# Patient Record
Sex: FEMALE | Race: WHITE | NOT HISPANIC OR LATINO | Employment: OTHER | URBAN - METROPOLITAN AREA
[De-identification: names, ages, dates, MRNs, and addresses within clinical notes are randomized per-mention and may not be internally consistent; named-entity substitution may affect disease eponyms.]

---

## 2017-08-03 PROBLEM — Z85.3 HISTORY OF BREAST CANCER: Status: ACTIVE | Noted: 2017-08-03

## 2018-01-24 PROBLEM — Q25.1 COARCTATION OF THE AORTA, SIMPLEX: Status: ACTIVE | Noted: 2018-01-24

## 2018-03-13 ENCOUNTER — TELEPHONE (OUTPATIENT)
Dept: HEMATOLOGY ONCOLOGY | Facility: MEDICAL CENTER | Age: 68
End: 2018-03-13

## 2018-03-13 DIAGNOSIS — Z87.891 HISTORY OF TOBACCO USE: ICD-10-CM

## 2018-03-13 NOTE — TELEPHONE ENCOUNTER
Followed up with patient and informed her that we need to wait until after her shared decision making visit to order and schedule the low dose CT for lung cancer screening because she needs to complete the shared decision making visit before the insurance will approve the exam.  Informed her that we can schedule her for the LDCT when she is seen in our office on March 19.  Pt verbalized understanding and agreed.

## 2018-03-13 NOTE — TELEPHONE ENCOUNTER
Patient called medical oncology/hematology office very upset and frustrated that she is unable to get scheduled for her low dose CT ordered by Dr. Olivares in [place]. RN provided education regarding process for lung cancer screening program, explaining that we prescreen referrals to ensure the patient meets eligibility criteria for the program and then the patient sees our APRN for a shared decision making visit prior to LDCT being performed/approved by insurance.      Received referral to lung cancer screening program.  Chart review to assess for lung cancer screening program eligibility.   1. Age 55-77 yrs of age? Yes 67 y.o.  2. 30 pack year hx of smoking, or greater? Yes 1 [id number]= 30 pkyr hx  3. Current smoker or if quit, has pt quit within last 15 yrs?Yes    4. Any signs or symptoms of lung cancer? Pt denies cough/SOB/chest pain  5. Previous history of lung cancer? No hx of lung cancer; pt with hx of breast ca  6. Chest CT within past 12 mos.? None noted. Pt states she had a CT chest performed about a year and a half ago.  Patient does meet eligibility criteria. Patient scheduled by RN for shared decision making visit.   RN scheduled pt for SDM visit on March 19 at 2pm at [address] [facility] [address].    Pt states she wants to have her LDCT performed on March 26 in the afternoon as she has an appointment with cardiology on that day.

## 2018-03-19 ENCOUNTER — OFFICE VISIT (OUTPATIENT)
Dept: HEMATOLOGY ONCOLOGY | Facility: MEDICAL CENTER | Age: 68
End: 2018-03-19
Payer: MEDICARE

## 2018-03-19 VITALS
WEIGHT: 156.42 LBS | HEART RATE: 75 BPM | RESPIRATION RATE: 14 BRPM | OXYGEN SATURATION: 94 % | SYSTOLIC BLOOD PRESSURE: 116 MMHG | DIASTOLIC BLOOD PRESSURE: 72 MMHG | TEMPERATURE: 98.4 F | BODY MASS INDEX: 23.1 KG/M2

## 2018-03-19 DIAGNOSIS — Z87.891 PERSONAL HISTORY OF NICOTINE DEPENDENCE: ICD-10-CM

## 2018-03-19 PROCEDURE — G0296 VISIT TO DETERM LDCT ELIG: HCPCS | Performed by: NURSE PRACTITIONER

## 2018-03-19 ASSESSMENT — ENCOUNTER SYMPTOMS
HEMOPTYSIS: 0
COUGH: 0
SPUTUM PRODUCTION: 0
SHORTNESS OF BREATH: 1
WEIGHT LOSS: 0
WHEEZING: 0

## 2018-03-19 ASSESSMENT — PAIN SCALES - GENERAL: PAINLEVEL: NO PAIN

## 2018-03-19 NOTE — PROGRESS NOTES
Subjective:      Jenise Arango is a 67 y.o. female who presents for Lung Cancer Screening Program, for lung cancer screening shared decision making visit.         HPI    Patient seen today for initial lung cancer screening visit. Patient referred by Dr. Olivares, oncologist in [place].     The patient meets eligibility criteria including age, smoking history (30+ pack years), if former smoker, quit in the last 15 years, and absence of signs or symptoms of lung cancer.    - Age - 67  - Smoking history - Patient has smoked for 35 years at an average of 1 ppd = 35 pack year smoking history.  - Current smoking status - Former smoker. Quit Dec. 13, 2011, 6.5 years ago.   - No symptoms of lung cancer and no previous history of lung cancer     Allergies   Allergen Reactions   • Ceclor [Cefaclor]    • Norco [Apap-Fd&C Yellow #10 [place]-Hydrocodone] Vomiting   • Septra [Bactrim Ds]      Current Outpatient Prescriptions on File Prior to Visit   Medication Sig Dispense Refill   • aspirin EC (ECOTRIN) 81 MG Tablet Delayed Response Take 1 Tab by mouth every day. 100 Tab 3   • Cholecalciferol (VITAMIN D-3) 1000 UNITS Cap Take 1 Capsule by mouth every day. 90 Cap 3     No current facility-administered medications on file prior to visit.        Review of Systems   Constitutional: Negative for malaise/fatigue and weight loss.   Respiratory: Positive for shortness of breath (with exertion). Negative for cough, hemoptysis, sputum production and wheezing.           Objective:     /72   Pulse 75   Temp 36.9 °C (98.4 °F)   Resp 14   Wt 70.9 kg (156 lb 6.7 oz)   SpO2 94%   BMI 23.10 kg/m²      Physical Exam   Constitutional: She is oriented to person, place, and time. She appears well-developed and well-nourished. No distress.   HENT:   Head: Normocephalic and atraumatic.   Cardiovascular: Normal rate, regular rhythm and normal heart sounds.  Exam reveals no gallop and no friction rub.    No murmur heard.  Pulmonary/Chest:  Effort normal and breath sounds normal. No respiratory distress. She has no wheezes.   Musculoskeletal: Normal range of motion.   Neurological: She is alert and oriented to person, place, and time.   Skin: Skin is warm and dry. She is not diaphoretic.   Vitals reviewed.              Assessment/Plan:     1. Personal history of nicotine dependence  CT-LUNG CANCER-SCREENING         We conducted a shared decision-making process using a decision aid. We reviewed benefits and harms of screening, including false positives and potential need for additional diagnostic testing, the possibility of over diagnosis, and total radiation exposure.    We discussed the importance of adhering to annual LDCT screening. We also discussed the impact of comorbities on the patient's the ability or willingness to undergo diagnostic procedure(s) and treatment.    Counseling on the importance of maintaining cigarette smoking abstinence if former smoker; or the importance of smoking cessation if current smoker and, if appropriate, furnishing of information about tobacco cessation interventions.    Based on our discussion, we have decided to begin annual lung cancer screening starting now.

## 2018-03-29 ENCOUNTER — APPOINTMENT (OUTPATIENT)
Dept: RADIOLOGY | Facility: MEDICAL CENTER | Age: 68
End: 2018-03-29
Attending: NURSE PRACTITIONER
Payer: MEDICARE

## 2018-04-03 ENCOUNTER — HOSPITAL ENCOUNTER (OUTPATIENT)
Dept: RADIOLOGY | Facility: MEDICAL CENTER | Age: 68
End: 2018-04-03
Attending: NURSE PRACTITIONER
Payer: MEDICARE

## 2018-04-03 DIAGNOSIS — Z87.891 PERSONAL HISTORY OF NICOTINE DEPENDENCE: ICD-10-CM

## 2018-04-03 PROCEDURE — G0297 LDCT FOR LUNG CA SCREEN: HCPCS

## 2018-04-04 ENCOUNTER — TELEPHONE (OUTPATIENT)
Dept: HEMATOLOGY ONCOLOGY | Facility: MEDICAL CENTER | Age: 68
End: 2018-04-04

## 2018-04-04 NOTE — TELEPHONE ENCOUNTER
"Phoned patient with results of LDCT exam performed on 4/3/2018 .  Notified her that the results showed one very small nodule that had a benign, or non cancer, appearance. To make sure these nodule(s) are benign, and remain unchanged, we recommend a follow-up low-dose chest CT in 12 months.   Informed of incidental findings of \"tortuous aortic with prominence of the proximal descending thoracic aorta that could indicate history of coarctation.\"  Patient states she became aware of the finding in when she had her previous CT scan in March 2016.  She states she is planning to see a cardiologist in two months.  Patient agrees to all recommendations. Referring provider Dr. Casie Olivares and Dr. Gee Perez faxed results and recommendations. Health maintenance updated and patient sent lung cancer screening result letter.    "

## 2018-06-15 ENCOUNTER — OFFICE VISIT (OUTPATIENT)
Dept: CARDIOLOGY | Facility: CLINIC | Age: 68
End: 2018-06-15
Payer: MEDICARE

## 2018-06-15 VITALS
HEART RATE: 81 BPM | SYSTOLIC BLOOD PRESSURE: 102 MMHG | BODY MASS INDEX: 23.11 KG/M2 | HEIGHT: 69 IN | WEIGHT: 156 LBS | OXYGEN SATURATION: 94 % | DIASTOLIC BLOOD PRESSURE: 78 MMHG

## 2018-06-15 DIAGNOSIS — R07.9 CHEST PAIN, UNSPECIFIED TYPE: ICD-10-CM

## 2018-06-15 DIAGNOSIS — Q25.1 COARCTATION OF AORTA: ICD-10-CM

## 2018-06-15 DIAGNOSIS — R06.09 DYSPNEA ON EXERTION: ICD-10-CM

## 2018-06-15 LAB — EKG IMPRESSION: NORMAL

## 2018-06-15 PROCEDURE — 93000 ELECTROCARDIOGRAM COMPLETE: CPT | Performed by: INTERNAL MEDICINE

## 2018-06-15 PROCEDURE — 99205 OFFICE O/P NEW HI 60 MIN: CPT | Mod: 25 | Performed by: INTERNAL MEDICINE

## 2018-06-15 ASSESSMENT — ENCOUNTER SYMPTOMS
ABDOMINAL PAIN: 0
SHORTNESS OF BREATH: 1
DEPRESSION: 0
DIZZINESS: 0
ORTHOPNEA: 0
FALLS: 0
LOSS OF CONSCIOUSNESS: 0
PND: 0
PALPITATIONS: 0

## 2018-06-15 NOTE — LETTER
[facility] [address],   [address] [place] [address]  Phone: [phone]  Fax: [phone]              Jenise Arango  1950    Encounter Date: 6/15/2018    Alda Crews M.D.          PROGRESS NOTE:    Subjective:   Jenise Arango is a 67 y.o. female referred to cardiology clinic for management of a coarctation of the aorta.  Patient underwent a chest x-ray in 2016 that showed concern for possible mass.  She subsequently underwent a CT scan that showed coarctation of the aorta.    Patient feels well overall.  She exercises at the gym 3-4 times a week either on the bicycle or walking on the track.  She only gets her heart rate up to low 100s as she states that she gets short of breath should she try to exercise any more strenuous.  No pain in the legs with exercise.    She reports occasional mild aching nonradiating pain over her left breast that occurs only while she is at rest and spontaneously resolve in a few minutes.  Symptoms occur about once every 2 weeks without any associated dyspnea or dizziness.  Symptoms started about 6 months ago.  No symptoms of exercise.    She has a history of left-sided breast cancer status post lumpectomy and radiation about 3 years ago.    Referring physician: Gee Perez MD    Past Medical History:   Diagnosis Date   • Back pain    • Cancer (HCC)     LEFT BREAST CANCER   • Plantar fasciitis      Past Surgical History:   Procedure Laterality Date   • OTHER      Right Side Femur Head   • OTHER      D& C   • OTHER ORTHOPEDIC SURGERY      LEFT FEMUR     History reviewed. No pertinent family history.     Social History     Social History   • Marital status:      Spouse name: N/A   • Number of children: N/A   • Years of education: N/A     Occupational History   • Not on file.     Social History Main Topics   • Smoking status: Former Smoker     Packs/day: 1.00     Years: 35.00     Types: Cigarettes   "    Quit date: 12/13/2011   • Smokeless tobacco: Never Used   • Alcohol use 0.0 oz/week      Comment: occasionally   • Drug use: No   • Sexual activity: Yes     Partners: Male     Other Topics Concern   • Not on file     Social History Narrative   • No narrative on file     No recreational drug use.  Used to work for IT in a hospital.    Allergies   Allergen Reactions   • Ceclor [Cefaclor]    • Norco [Apap-Fd&C Yellow #10 [place]-Hydrocodone] Vomiting   • Septra [Bactrim Ds]      Outpatient Encounter Prescriptions as of 6/15/2018   Medication Sig Dispense Refill   • B Complex Vitamins (VITAMIN-B COMPLEX PO) Take  by mouth.     • Calcium Carbonate-Vit D-Min (CALCIUM 1200 PO) Take  by mouth.     • Probiotic Product (PROBIOTIC ACIDOPHILUS BEADS PO) Take  by mouth.     • Cholecalciferol (VITAMIN D-3) 1000 UNITS Cap Take 1 Capsule by mouth every day. 90 Cap 3   • aspirin EC (ECOTRIN) 81 MG Tablet Delayed Response Take 1 Tab by mouth every day. 100 Tab 3     No facility-administered encounter medications on file as of 6/15/2018.      Review of Systems   Constitutional: Negative for malaise/fatigue.   Respiratory: Positive for shortness of breath.    Cardiovascular: Positive for chest pain. Negative for palpitations, orthopnea, leg swelling and PND.   Gastrointestinal: Negative for abdominal pain.   Musculoskeletal: Negative for falls.   Neurological: Negative for dizziness and loss of consciousness.   Psychiatric/Behavioral: Negative for depression.   All other systems reviewed and are negative.       Objective:   /78   Pulse 81   Ht 1.753 m (5' 9\")   Wt 70.8 kg (156 lb)   SpO2 94%   BMI 23.04 kg/m²      Physical Exam   Constitutional: She is oriented to person, place, and time. She appears well-developed and well-nourished. No distress.   HENT:   Head: Normocephalic and atraumatic.   Eyes: Conjunctivae are normal.   Neck: Normal range of motion. Neck supple.   Cardiovascular: Normal rate, regular rhythm and " normal heart sounds.  Exam reveals no gallop and no friction rub.    No murmur heard.  Normal pulses in the upper extremities.  Slightly diminished in bilateral lower extremities.   Pulmonary/Chest: Effort normal and breath sounds normal. No respiratory distress. She has no wheezes. She has no rales.   Abdominal: Soft. She exhibits no distension. There is no tenderness.   Musculoskeletal: She exhibits no edema.   Neurological: She is alert and oriented to person, place, and time.   Skin: Skin is warm and dry. She is not diaphoretic.   Psychiatric: She has a normal mood and affect. Her behavior is normal.   Nursing note and vitals reviewed.    EKG performed today was personally reviewed and showed normal sinus rhythm at 65 bpm.  Normal EKG.    CT chest performed in 2016.  Report was reviewed.  Patient had a coarctation of the aorta a few centimeters distal to the takeoff of the left subclavian artery.  The narrowing of the core measures 9-10 mm.  Post core dilation up to 3.3 cm involving the distal thoracic aorta.  No hypertrophy or lateralization through the intercostal arteries.    Assessment:     1. Coarctation of aorta  EKG    ECHOCARDIOGRAM COMP W/O CONT   2. Chest pain, unspecified type  TREADMILL STRESS   3. Dyspnea on exertion  ECHOCARDIOGRAM COMP W/O CONT       Medical Decision Making:  Today's Assessment / Status / Plan:     Chest discomfort:   Dyspnea:   Coarctation of the aorta:    Patient does not have hypertension.  No claudication symptoms.  No heart failure symptoms.  No other cardiac history.  Patient has mild dyspnea with extreme exertion and chest discomfort without any clear triggers.  I will refer her for an exercise treadmill test for ischemic evaluation.  She will also be referred for an echocardiogram to evaluate her coarctation and for the above-mentioned symptoms.  In terms of prognosis, given her advanced age at the time of diagnosis and the absence of hypertension and claudication  symptoms, the chance of her needing surgery for this is extremely low.  However will follow-up the above-mentioned tests and reevaluate.  Should the patient start having worsening symptoms she will let us know.  In the meantime she was advised to exercise more rigorously as tolerated.  All the above information was explained to the patient in detail.    Return to clinic in 2 months or earlier if needed.    Thank you for allowing me to participate in the care of this patient. Please do not hesitate to contact me with any questions.    Alda Crews MD  Cardiologist  [facility]      PLEASE NOTE: This dictation was created using voice recognition software.               Gee Perez M.D.  [address] [facility] [address]  VIA In Basket

## 2018-06-15 NOTE — PROGRESS NOTES
Subjective:   Jenise Arango is a 67 y.o. female referred to cardiology clinic for management of a coarctation of the aorta.  Patient underwent a chest x-ray in 2016 that showed concern for possible mass.  She subsequently underwent a CT scan that showed coarctation of the aorta.    Patient feels well overall.  She exercises at the gym 3-4 times a week either on the bicycle or walking on the track.  She only gets her heart rate up to low 100s as she states that she gets short of breath should she try to exercise any more strenuous.  No pain in the legs with exercise.    She reports occasional mild aching nonradiating pain over her left breast that occurs only while she is at rest and spontaneously resolve in a few minutes.  Symptoms occur about once every 2 weeks without any associated dyspnea or dizziness.  Symptoms started about 6 months ago.  No symptoms of exercise.    She has a history of left-sided breast cancer status post lumpectomy and radiation about 3 years ago.    Referring physician: Gee Perez MD    Past Medical History:   Diagnosis Date   • Back pain    • Cancer (HCC)     LEFT BREAST CANCER   • Plantar fasciitis      Past Surgical History:   Procedure Laterality Date   • OTHER      Right Side Femur Head   • OTHER      D& C   • OTHER ORTHOPEDIC SURGERY      LEFT FEMUR     History reviewed. No pertinent family history.     Social History     Social History   • Marital status:      Spouse name: N/A   • Number of children: N/A   • Years of education: N/A     Occupational History   • Not on file.     Social History Main Topics   • Smoking status: Former Smoker     Packs/day: 1.00     Years: 35.00     Types: Cigarettes     Quit date: 12/13/2011   • Smokeless tobacco: Never Used   • Alcohol use 0.0 oz/week      Comment: occasionally   • Drug use: No   • Sexual activity: Yes     Partners: Male     Other Topics Concern   • Not on file     Social History Narrative   • No narrative on file     No  "recreational drug use.  Used to work for IT in a hospital.    Allergies   Allergen Reactions   • Ceclor [Cefaclor]    • Norco [Apap-Fd&C Yellow #10 [place]-Hydrocodone] Vomiting   • Septra [Bactrim Ds]      Outpatient Encounter Prescriptions as of 6/15/2018   Medication Sig Dispense Refill   • B Complex Vitamins (VITAMIN-B COMPLEX PO) Take  by mouth.     • Calcium Carbonate-Vit D-Min (CALCIUM 1200 PO) Take  by mouth.     • Probiotic Product (PROBIOTIC ACIDOPHILUS BEADS PO) Take  by mouth.     • Cholecalciferol (VITAMIN D-3) 1000 UNITS Cap Take 1 Capsule by mouth every day. 90 Cap 3   • aspirin EC (ECOTRIN) 81 MG Tablet Delayed Response Take 1 Tab by mouth every day. 100 Tab 3     No facility-administered encounter medications on file as of 6/15/2018.      Review of Systems   Constitutional: Negative for malaise/fatigue.   Respiratory: Positive for shortness of breath.    Cardiovascular: Positive for chest pain. Negative for palpitations, orthopnea, leg swelling and PND.   Gastrointestinal: Negative for abdominal pain.   Musculoskeletal: Negative for falls.   Neurological: Negative for dizziness and loss of consciousness.   Psychiatric/Behavioral: Negative for depression.   All other systems reviewed and are negative.       Objective:   /78   Pulse 81   Ht 1.753 m (5' 9\")   Wt 70.8 kg (156 lb)   SpO2 94%   BMI 23.04 kg/m²     Physical Exam   Constitutional: She is oriented to person, place, and time. She appears well-developed and well-nourished. No distress.   HENT:   Head: Normocephalic and atraumatic.   Eyes: Conjunctivae are normal.   Neck: Normal range of motion. Neck supple.   Cardiovascular: Normal rate, regular rhythm and normal heart sounds.  Exam reveals no gallop and no friction rub.    No murmur heard.  Normal pulses in the upper extremities.  Slightly diminished in bilateral lower extremities.   Pulmonary/Chest: Effort normal and breath sounds normal. No respiratory distress. She has no " wheezes. She has no rales.   Abdominal: Soft. She exhibits no distension. There is no tenderness.   Musculoskeletal: She exhibits no edema.   Neurological: She is alert and oriented to person, place, and time.   Skin: Skin is warm and dry. She is not diaphoretic.   Psychiatric: She has a normal mood and affect. Her behavior is normal.   Nursing note and vitals reviewed.    EKG performed today was personally reviewed and showed normal sinus rhythm at 65 bpm.  Normal EKG.    CT chest performed in 2016.  Report was reviewed.  Patient had a coarctation of the aorta a few centimeters distal to the takeoff of the left subclavian artery.  The narrowing of the core measures 9-10 mm.  Post core dilation up to 3.3 cm involving the distal thoracic aorta.  No hypertrophy or lateralization through the intercostal arteries.    Assessment:     1. Coarctation of aorta  EKG    ECHOCARDIOGRAM COMP W/O CONT   2. Chest pain, unspecified type  TREADMILL STRESS   3. Dyspnea on exertion  ECHOCARDIOGRAM COMP W/O CONT       Medical Decision Making:  Today's Assessment / Status / Plan:     Chest discomfort:   Dyspnea:   Coarctation of the aorta:    Patient does not have hypertension.  No claudication symptoms.  No heart failure symptoms.  No other cardiac history.  Patient has mild dyspnea with extreme exertion and chest discomfort without any clear triggers.  I will refer her for an exercise treadmill test for ischemic evaluation.  She will also be referred for an echocardiogram to evaluate her coarctation and for the above-mentioned symptoms.  In terms of prognosis, given her advanced age at the time of diagnosis and the absence of hypertension and claudication symptoms, the chance of her needing surgery for this is extremely low.  However will follow-up the above-mentioned tests and reevaluate.  Should the patient start having worsening symptoms she will let us know.  In the meantime she was advised to exercise more rigorously as  tolerated.  All the above information was explained to the patient in detail.    Return to clinic in 2 months or earlier if needed.    Thank you for allowing me to participate in the care of this patient. Please do not hesitate to contact me with any questions.    Alda Crews MD  Cardiologist  [facility]      PLEASE NOTE: This dictation was created using voice recognition software.

## 2018-08-21 ENCOUNTER — OFFICE VISIT (OUTPATIENT)
Dept: CARDIOLOGY | Facility: CLINIC | Age: 68
End: 2018-08-21
Payer: MEDICARE

## 2018-08-21 VITALS
HEIGHT: 69 IN | DIASTOLIC BLOOD PRESSURE: 70 MMHG | WEIGHT: 153 LBS | OXYGEN SATURATION: 96 % | HEART RATE: 66 BPM | BODY MASS INDEX: 22.66 KG/M2 | SYSTOLIC BLOOD PRESSURE: 100 MMHG

## 2018-08-21 DIAGNOSIS — Q25.1 COARCTATION OF THE AORTA, SIMPLEX: ICD-10-CM

## 2018-08-21 DIAGNOSIS — R07.9 CHEST PAIN, UNSPECIFIED TYPE: ICD-10-CM

## 2018-08-21 DIAGNOSIS — R06.09 DYSPNEA ON EXERTION: ICD-10-CM

## 2018-08-21 PROCEDURE — 99215 OFFICE O/P EST HI 40 MIN: CPT | Performed by: INTERNAL MEDICINE

## 2018-08-21 RX ORDER — MAGNESIUM OXIDE 400 MG/1
400 TABLET ORAL DAILY
COMMUNITY

## 2018-08-21 ASSESSMENT — ENCOUNTER SYMPTOMS
DEPRESSION: 0
PALPITATIONS: 0
SHORTNESS OF BREATH: 1
ABDOMINAL PAIN: 0
LOSS OF CONSCIOUSNESS: 0
DIZZINESS: 0
FALLS: 0
ORTHOPNEA: 0
PND: 0

## 2018-08-21 NOTE — LETTER
[facility] [address],   [address]nd [place] [address]  Phone: [phone]  Fax: [phone]              Jenise Arango  1950    Encounter Date: 8/21/2018    Alda Crews M.D.          PROGRESS NOTE:    Subjective:   Jenise Arango is a 67 y.o. female presenting to clinic for follow-up on her coarctation of the aorta.      Pertinent history:  Coarctation of the aorta  History of left-sided breast cancer status post lumpectomy and radiation around 2015.    Since her last appointment, patient has been feeling really well.  She has not started exercising a lot more frequently.  She has been swimming regularly.  Does report dyspnea on exertion with swimming.  Overall however her symptoms have been improving.  No chest discomfort with exertion.    She describes a history of left-sided pain underneath her left breast that has been ongoing for the past 20 years.  Patient has previously been told she has costochondritis and her symptoms are unchanged in severity or frequency.     Past Medical History:   Diagnosis Date   • Back pain    • Cancer (HCC)     LEFT BREAST CANCER   • Plantar fasciitis      Past Surgical History:   Procedure Laterality Date   • OTHER      Right Side Femur Head   • OTHER      D& C   • OTHER ORTHOPEDIC SURGERY      LEFT FEMUR     History reviewed. No pertinent family history.     Social History     Social History   • Marital status:      Spouse name: N/A   • Number of children: N/A   • Years of education: N/A     Occupational History   • Not on file.     Social History Main Topics   • Smoking status: Former Smoker     Packs/day: 1.00     Years: 35.00     Types: Cigarettes     Quit date: 12/13/2011   • Smokeless tobacco: Never Used   • Alcohol use 0.0 oz/week      Comment: occasionally   • Drug use: No   • Sexual activity: Yes     Partners: Male     Other Topics Concern   • Not on file     Social History Narrative    "  • No narrative on file     No recreational drug use.  Used to work for IT in a hospital.    Allergies   Allergen Reactions   • Ceclor [Cefaclor]    • Norco [Apap-Fd&C Yellow #10 [place]-Hydrocodone] Vomiting   • Septra [Bactrim Ds]      Outpatient Encounter Prescriptions as of 8/21/2018   Medication Sig Dispense Refill   • magnesium oxide (MAG-OX) 400 MG Tab Take 400 mg by mouth every day.     • B Complex Vitamins (VITAMIN-B COMPLEX PO) Take  by mouth.     • Calcium Carbonate-Vit D-Min (CALCIUM 1200 PO) Take  by mouth.     • Probiotic Product (PROBIOTIC ACIDOPHILUS BEADS PO) Take  by mouth.     • Cholecalciferol (VITAMIN D-3) 1000 UNITS Cap Take 1 Capsule by mouth every day. 90 Cap 3   • [DISCONTINUED] aspirin EC (ECOTRIN) 81 MG Tablet Delayed Response Take 1 Tab by mouth every day. 100 Tab 3     No facility-administered encounter medications on file as of 8/21/2018.      Review of Systems   Constitutional: Negative for malaise/fatigue.   Respiratory: Positive for shortness of breath.    Cardiovascular: Positive for chest pain. Negative for palpitations, orthopnea, leg swelling and PND.   Gastrointestinal: Negative for abdominal pain.   Musculoskeletal: Negative for falls.   Neurological: Negative for dizziness and loss of consciousness.   Psychiatric/Behavioral: Negative for depression.   All other systems reviewed and are negative.       Objective:   /70   Pulse 66   Ht 1.753 m (5' 9\")   Wt 69.4 kg (153 lb)   SpO2 96%   BMI 22.59 kg/m²      Physical Exam   Constitutional: She is oriented to person, place, and time. She appears well-developed and well-nourished. No distress.   HENT:   Head: Normocephalic and atraumatic.   Eyes: Conjunctivae are normal.   Neck: Normal range of motion. Neck supple.   Cardiovascular: Normal rate, regular rhythm and normal heart sounds.  Exam reveals no gallop and no friction rub.    No murmur heard.  Pulmonary/Chest: Effort normal and breath sounds normal. No respiratory " distress. She has no wheezes. She has no rales.   Abdominal: Soft. She exhibits no distension. There is no tenderness.   Musculoskeletal: She exhibits no edema.   Neurological: She is alert and oriented to person, place, and time.   Skin: Skin is warm and dry. She is not diaphoretic.   Psychiatric: She has a normal mood and affect. Her behavior is normal.   Nursing note and vitals reviewed.    CT chest performed in 2016 showed a coarctation of the aorta a few centimeters distal to the takeoff of the left subclavian artery.  The narrowing of the core measures 9-10 mm.  Post core dilation up to 3.3 cm involving the distal thoracic aorta.  No hypertrophy or lateralization through the intercostal arteries.    Echocardiogram performed in June 2018 was reviewed and showed normal LV systolic function.  EF 65%.  Coarctation of the aorta with a peak gradient of 13 mmHg.  No major valvular pathology noted.    Exercise treadmill test performed in June 2018.  Tracings were personally reviewed.  Patient exercised for 8 minutes and 18 seconds on the Vladimir protocol.  Achieved 10.1 METs.  No arrhythmias noted.  Isolated PVCs noted.  No ST-T wave changes noted.    Assessment:     1. Coarctation of the aorta, simplex     2. Chest pain, unspecified type     3. Dyspnea on exertion         Medical Decision Making:  Today's Assessment / Status / Plan:     Coarctation of the aorta: Echocardiogram reviewed as above.  Peak gradient across the aortic coarctation is 13 mmHg, mild coarctation.  Patient will need echocardiograms every 2 or 3 years for reevaluation unless symptoms change.    Dyspnea on exertion: Possibly related to her significant smoking history.  She does not have an official COPD diagnosis.  Unlikely to be her anginal equivalent as her treadmill stress test was unremarkable as noted above.  Continue exercising as tolerated.  She may need to follow-up with her primary care physician to discuss inhalers if needed or pulmonary  function testing as deemed necessary by her PCP.    Chest discomfort: Still has ongoing symptoms.  Atypical in nature.  Likely musculoskeletal as discussed above in the HPI.  Low risk treadmill stress test.  Patient can try warm/cold compresses as needed.  Try stretching exercises as well.  NSAIDs as needed.  No further cardiac workup at this time.      Return to clinic in 1 year or earlier if needed.    Thank you for allowing me to participate in the care of this patient. Please do not hesitate to contact me with any questions.    Alda Crews MD  Cardiologist  [facility]      PLEASE NOTE: This dictation was created using voice recognition software.               Gee Perez M.D.  [address] [facility] [address]  VIA In Basket

## 2018-08-29 PROBLEM — E55.9 VITAMIN D DEFICIENCY: Status: ACTIVE | Noted: 2018-08-29

## 2018-08-29 PROBLEM — M81.8 AGE-RELATED OSTEOPOROSIS WITHOUT FRACTURE: Status: ACTIVE | Noted: 2018-08-29

## 2018-12-18 ENCOUNTER — TELEPHONE (OUTPATIENT)
Dept: HEMATOLOGY ONCOLOGY | Facility: MEDICAL CENTER | Age: 68
End: 2018-12-18

## 2018-12-18 NOTE — TELEPHONE ENCOUNTER
"Patient is requesting a call back regarding an office visit on 03/19/2018 with LIZBETH Harrison. Patient stated \"I'm not quite sure but I thought Sharmaine had mentioned she was going to continue to order the CT Lung scan. Can I please get a phone call as soon as possible.\" I let patient know I will relay this message to LIZBETH Hussein due to Sharmaine being out of office today and tomorrow.  "

## 2018-12-20 NOTE — TELEPHONE ENCOUNTER
"Called and spoke with patient regarding Lung Ct Scan, I let patient know per Wendy Swan, LIZBETH:    \"Joyce put in a message 4/4/18 - annual CT will be ordered by PCP from now on.\"    Patient was grateful and will call our office if there are any further questions or concerns.   "

## 2019-04-04 PROBLEM — J45.20 INTERMITTENT ASTHMA WITHOUT COMPLICATION: Status: ACTIVE | Noted: 2019-04-04

## 2019-05-22 ENCOUNTER — HOSPITAL ENCOUNTER (OUTPATIENT)
Dept: RADIOLOGY | Facility: MEDICAL CENTER | Age: 69
End: 2019-05-22
Attending: INTERNAL MEDICINE
Payer: MEDICARE

## 2019-05-22 DIAGNOSIS — Z87.891 PERSONAL HISTORY OF TOBACCO USE, PRESENTING HAZARDS TO HEALTH: ICD-10-CM

## 2019-05-22 PROCEDURE — G0297 LDCT FOR LUNG CA SCREEN: HCPCS

## 2019-05-29 ENCOUNTER — OFFICE VISIT (OUTPATIENT)
Dept: CARDIOLOGY | Facility: MEDICAL CENTER | Age: 69
End: 2019-05-29
Payer: MEDICARE

## 2019-05-29 VITALS
BODY MASS INDEX: 23.25 KG/M2 | WEIGHT: 157 LBS | HEART RATE: 100 BPM | OXYGEN SATURATION: 95 % | DIASTOLIC BLOOD PRESSURE: 60 MMHG | SYSTOLIC BLOOD PRESSURE: 90 MMHG | HEIGHT: 69 IN

## 2019-05-29 DIAGNOSIS — Q25.1 COARCTATION OF THE AORTA, SIMPLEX: ICD-10-CM

## 2019-05-29 PROCEDURE — 99214 OFFICE O/P EST MOD 30 MIN: CPT | Performed by: INTERNAL MEDICINE

## 2019-05-29 RX ORDER — ALBUTEROL SULFATE 90 UG/1
AEROSOL, METERED RESPIRATORY (INHALATION)
Refills: 0 | COMMUNITY
Start: 2019-04-04 | End: 2019-05-29

## 2019-05-29 RX ORDER — INHALER, ASSIST DEVICES
SPACER (EA) MISCELLANEOUS
Refills: 0 | COMMUNITY
Start: 2019-04-04 | End: 2019-05-29

## 2019-05-29 RX ORDER — ALBUTEROL SULFATE 90 UG/1
AEROSOL, METERED RESPIRATORY (INHALATION)
COMMUNITY
Start: 2019-04-04 | End: 2019-05-29

## 2019-05-29 ASSESSMENT — ENCOUNTER SYMPTOMS
DEPRESSION: 0
LOSS OF CONSCIOUSNESS: 0
FALLS: 0
DIZZINESS: 0
PND: 0
PALPITATIONS: 0
ABDOMINAL PAIN: 0
ORTHOPNEA: 0
SHORTNESS OF BREATH: 0

## 2019-05-29 NOTE — PROGRESS NOTES
Subjective:   Jenise Arango is a 62 female presented clinic for follow-up on coarctation of the aorta.    Pertinent history:  Coarctation of the aorta  History of left-sided breast cancer status post lumpectomy and radiation around 2015.    Patient is planning to clinic today to discuss her CT chest results.  She was undergoing a CT for screening purposes given her smoking history and is worried about the atherosclerosis, calcification and dilatation noted in her aorta.    She has otherwise been feeling well overall.  She has been exercising regularly and denies any anginal symptoms.      Past Medical History:   Diagnosis Date   • Back pain    • Cancer (HCC)     LEFT BREAST CANCER   • Plantar fasciitis      Past Surgical History:   Procedure Laterality Date   • OTHER      Right Side Femur Head   • OTHER      D& C   • OTHER ORTHOPEDIC SURGERY      LEFT FEMUR     History reviewed. No pertinent family history.     Social History     Social History   • Marital status:      Spouse name: N/A   • Number of children: N/A   • Years of education: N/A     Occupational History   • Not on file.     Social History Main Topics   • Smoking status: Former Smoker     Packs/day: 1.00     Years: 35.00     Types: Cigarettes     Quit date: 12/13/2011   • Smokeless tobacco: Never Used   • Alcohol use 0.0 oz/week      Comment: occasionally   • Drug use: No   • Sexual activity: Yes     Partners: Male     Other Topics Concern   • Not on file     Social History Narrative   • No narrative on file     Allergies   Allergen Reactions   • Ceclor [Cefaclor]    • Norco [Apap-Fd&C Yellow #10 [place]-Hydrocodone] Vomiting   • Septra [Bactrim Ds]      Outpatient Encounter Prescriptions as of 5/29/2019   Medication Sig Dispense Refill   • magnesium oxide (MAG-OX) 400 MG Tab Take 400 mg by mouth every day.     • B Complex Vitamins (VITAMIN-B COMPLEX PO) Take  by mouth.     • Calcium Carbonate-Vit D-Min (CALCIUM 1200 PO) Take  by mouth.     •  "Probiotic Product (PROBIOTIC ACIDOPHILUS BEADS PO) Take  by mouth.     • Cholecalciferol (VITAMIN D-3) 1000 UNITS Cap Take 1 Capsule by mouth every day. 90 Cap 3   • [DISCONTINUED] Spacer/Aero-Holding Chambers (OPTICHAMBER GILMA) Misc USE AS DIRECTED  0   • [DISCONTINUED] albuterol 108 (90 Base) MCG/ACT Aero Soln inhalation aerosol inhale 2 puffs by mouth every 6 hours if needed for shortness of breath  0   • [DISCONTINUED] albuterol 108 (90 Base) MCG/ACT Aero Soln inhalation aerosol      • [DISCONTINUED] albuterol 108 (90 Base) MCG/ACT Aero Soln inhalation aerosol      • [DISCONTINUED] doxycycline (VIBRAMYCIN) 100 MG Tab Take 1 Tab by mouth 2 times a day. (Patient not taking: Reported on 5/29/2019) 20 Tab 0   • [DISCONTINUED] fluticasone (FLOVENT HFA) 220 MCG/ACT Aerosol Inhale 1 Puff by mouth 2 times a day. (Patient not taking: Reported on 5/29/2019) 1 Inhaler 3   • [DISCONTINUED] albuterol 108 (90 Base) MCG/ACT Aero Soln inhalation aerosol Inhale 2 Puffs by mouth every 6 hours as needed for Shortness of Breath. (Patient not taking: Reported on 5/29/2019) 8.5 g 3     No facility-administered encounter medications on file as of 5/29/2019.      Review of Systems   Constitutional: Negative for malaise/fatigue.   Respiratory: Negative for shortness of breath.    Cardiovascular: Negative for chest pain, palpitations, orthopnea, leg swelling and PND.   Gastrointestinal: Negative for abdominal pain.   Musculoskeletal: Negative for falls.   Neurological: Negative for dizziness and loss of consciousness.   Psychiatric/Behavioral: Negative for depression.   All other systems reviewed and are negative.       Objective:   BP (!) 90/60 (BP Location: Left arm, Patient Position: Sitting, BP Cuff Size: Adult)   Pulse 100   Ht 1.753 m (5' 9\")   Wt 71.2 kg (157 lb)   SpO2 95%   BMI 23.18 kg/m²     Physical Exam   Constitutional: She is oriented to person, place, and time. She appears well-developed and well-nourished. No " distress.   HENT:   Head: Normocephalic and atraumatic.   Eyes: Conjunctivae are normal. No scleral icterus.   Neck: Normal range of motion. Neck supple.   Cardiovascular: Normal rate, regular rhythm and normal heart sounds.  Exam reveals no gallop and no friction rub.    No murmur heard.  Pulmonary/Chest: Effort normal and breath sounds normal. No respiratory distress. She has no wheezes. She has no rales.   Abdominal: Soft. She exhibits no distension. There is no tenderness.   Musculoskeletal: She exhibits no edema.   Neurological: She is alert and oriented to person, place, and time.   Skin: Skin is warm and dry. She is not diaphoretic.   Psychiatric: She has a normal mood and affect. Her behavior is normal.   Nursing note and vitals reviewed.    CT chest performed in 2016 showed a coarctation of the aorta a few centimeters distal to the takeoff of the left subclavian artery.  The narrowing of the core measures 9-10 mm.  Post core dilation up to 3.3 cm involving the distal thoracic aorta.  No hypertrophy or lateralization through the intercostal arteries.    Echocardiogram performed in June 2018 showed normal LV systolic function.  EF 65%.  Coarctation of the aorta with a peak gradient of 13 mmHg.  No major valvular pathology noted.    Exercise treadmill test performed in June 2018. Patient exercised for 8 minutes and 18 seconds on the Vladimir protocol.  Achieved 10.1 METs.  No arrhythmias noted.  Isolated PVCs noted.  No ST-T wave changes noted.    Labs performed in April 2019 were reviewed and showed normal creatinine.    CT scan performed in May 2019, results were reviewed personally and showed atherosclerosis of the thoracic aorta with coronary artery calcifications.  Mild dilatation of the descending thoracic aorta measuring 3.4 cm, unchanged from prior study.    Assessment:     1. Coarctation of the aorta, simplex         Medical Decision Making:  Today's Assessment / Status / Plan:     Coaptation of the  aorta:  Abnormal CT results:  Dilatation of the descending thoracic aorta:  Coronary artery calcification:  Atherosclerosis of the thoracic aorta:    Patient has been reassured.  The dilatation of her descending aorta is unchanged from before and is not unexpected in the setting of her known coarctation..  In terms of her coarctation, she needs repeat echocardiograms every 2 to 3 years.  She has a significant and coronary artery calcification and atherosclerosis is not unexpected.  Patient exercises regularly and denies any anginal symptoms should she start having any anginal symptoms, she should let us know.    Total 25 minutes face-to-face time spent with patient, with greater than 50% of the total time discussing patient's issues and symptoms as listed above in assessment and plan, as well as managing coordination of care for future evaluation and treatment. Most of the time was spent discussing CT scan results, calcification, dilatation as noted above.     Return to clinic in 1 year or earlier if needed.    Thank you for allowing me to participate in the care of this patient. Please do not hesitate to contact me with any questions.    Alda Crews MD  Cardiologist  [facility]      PLEASE NOTE: This dictation was created using voice recognition software.

## 2019-05-29 NOTE — LETTER
[facility]   [address], [facility] [address]  [place] [address]  Phone: [phone]  Fax: [phone]              Jenise Arango  1950    Encounter Date: 5/29/2019    Alda Crews M.D.          PROGRESS NOTE:    Subjective:   Jenise Arango is a 62 female presented clinic for follow-up on coarctation of the aorta.    Pertinent history:  Coarctation of the aorta  History of left-sided breast cancer status post lumpectomy and radiation around 2015.    Patient is planning to clinic today to discuss her CT chest results.  She was undergoing a CT for screening purposes given her smoking history and is worried about the atherosclerosis, calcification and dilatation noted in her aorta.    She has otherwise been feeling well overall.  She has been exercising regularly and denies any anginal symptoms.      Past Medical History:   Diagnosis Date   • Back pain    • Cancer (HCC)     LEFT BREAST CANCER   • Plantar fasciitis      Past Surgical History:   Procedure Laterality Date   • OTHER      Right Side Femur Head   • OTHER      D& C   • OTHER ORTHOPEDIC SURGERY      LEFT FEMUR     History reviewed. No pertinent family history.     Social History     Social History   • Marital status:      Spouse name: N/A   • Number of children: N/A   • Years of education: N/A     Occupational History   • Not on file.     Social History Main Topics   • Smoking status: Former Smoker     Packs/day: 1.00     Years: 35.00     Types: Cigarettes     Quit date: 12/13/2011   • Smokeless tobacco: Never Used   • Alcohol use 0.0 oz/week      Comment: occasionally   • Drug use: No   • Sexual activity: Yes     Partners: Male     Other Topics Concern   • Not on file     Social History Narrative   • No narrative on file     Allergies   Allergen Reactions   • Ceclor [Cefaclor]    • Norco [Apap-Fd&C Yellow #10 [place]-Hydrocodone] Vomiting   • Septra [Bactrim Ds]      Outpatient Encounter Prescriptions as  of 5/29/2019   Medication Sig Dispense Refill   • magnesium oxide (MAG-OX) 400 MG Tab Take 400 mg by mouth every day.     • B Complex Vitamins (VITAMIN-B COMPLEX PO) Take  by mouth.     • Calcium Carbonate-Vit D-Min (CALCIUM 1200 PO) Take  by mouth.     • Probiotic Product (PROBIOTIC ACIDOPHILUS BEADS PO) Take  by mouth.     • Cholecalciferol (VITAMIN D-3) 1000 UNITS Cap Take 1 Capsule by mouth every day. 90 Cap 3   • [DISCONTINUED] Spacer/Aero-Holding Chambers (OPTICHAMBER GILMA) Misc USE AS DIRECTED  0   • [DISCONTINUED] albuterol 108 (90 Base) MCG/ACT Aero Soln inhalation aerosol inhale 2 puffs by mouth every 6 hours if needed for shortness of breath  0   • [DISCONTINUED] albuterol 108 (90 Base) MCG/ACT Aero Soln inhalation aerosol      • [DISCONTINUED] albuterol 108 (90 Base) MCG/ACT Aero Soln inhalation aerosol      • [DISCONTINUED] doxycycline (VIBRAMYCIN) 100 MG Tab Take 1 Tab by mouth 2 times a day. (Patient not taking: Reported on 5/29/2019) 20 Tab 0   • [DISCONTINUED] fluticasone (FLOVENT HFA) 220 MCG/ACT Aerosol Inhale 1 Puff by mouth 2 times a day. (Patient not taking: Reported on 5/29/2019) 1 Inhaler 3   • [DISCONTINUED] albuterol 108 (90 Base) MCG/ACT Aero Soln inhalation aerosol Inhale 2 Puffs by mouth every 6 hours as needed for Shortness of Breath. (Patient not taking: Reported on 5/29/2019) 8.5 g 3     No facility-administered encounter medications on file as of 5/29/2019.      Review of Systems   Constitutional: Negative for malaise/fatigue.   Respiratory: Negative for shortness of breath.    Cardiovascular: Negative for chest pain, palpitations, orthopnea, leg swelling and PND.   Gastrointestinal: Negative for abdominal pain.   Musculoskeletal: Negative for falls.   Neurological: Negative for dizziness and loss of consciousness.   Psychiatric/Behavioral: Negative for depression.   All other systems reviewed and are negative.       Objective:   BP (!) 90/60 (BP Location: Left arm, Patient  "Position: Sitting, BP Cuff Size: Adult)   Pulse 100   Ht 1.753 m (5' 9\")   Wt 71.2 kg (157 lb)   SpO2 95%   BMI 23.18 kg/m²      Physical Exam   Constitutional: She is oriented to person, place, and time. She appears well-developed and well-nourished. No distress.   HENT:   Head: Normocephalic and atraumatic.   Eyes: Conjunctivae are normal. No scleral icterus.   Neck: Normal range of motion. Neck supple.   Cardiovascular: Normal rate, regular rhythm and normal heart sounds.  Exam reveals no gallop and no friction rub.    No murmur heard.  Pulmonary/Chest: Effort normal and breath sounds normal. No respiratory distress. She has no wheezes. She has no rales.   Abdominal: Soft. She exhibits no distension. There is no tenderness.   Musculoskeletal: She exhibits no edema.   Neurological: She is alert and oriented to person, place, and time.   Skin: Skin is warm and dry. She is not diaphoretic.   Psychiatric: She has a normal mood and affect. Her behavior is normal.   Nursing note and vitals reviewed.    CT chest performed in 2016 showed a coarctation of the aorta a few centimeters distal to the takeoff of the left subclavian artery.  The narrowing of the core measures 9-10 mm.  Post core dilation up to 3.3 cm involving the distal thoracic aorta.  No hypertrophy or lateralization through the intercostal arteries.    Echocardiogram performed in June 2018 showed normal LV systolic function.  EF 65%.  Coarctation of the aorta with a peak gradient of 13 mmHg.  No major valvular pathology noted.    Exercise treadmill test performed in June 2018. Patient exercised for 8 minutes and 18 seconds on the Vladimir protocol.  Achieved 10.1 METs.  No arrhythmias noted.  Isolated PVCs noted.  No ST-T wave changes noted.    Labs performed in April 2019 were reviewed and showed normal creatinine.    CT scan performed in May 2019, results were reviewed personally and showed atherosclerosis of the thoracic aorta with coronary artery " calcifications.  Mild dilatation of the descending thoracic aorta measuring 3.4 cm, unchanged from prior study.    Assessment:     1. Coarctation of the aorta, simplex         Medical Decision Making:  Today's Assessment / Status / Plan:     Coaptation of the aorta:  Abnormal CT results:  Dilatation of the descending thoracic aorta:  Coronary artery calcification:  Atherosclerosis of the thoracic aorta:    Patient has been reassured.  The dilatation of her descending aorta is unchanged from before and is not unexpected in the setting of her known coarctation..  In terms of her coarctation, she needs repeat echocardiograms every 2 to 3 years.  She has a significant and coronary artery calcification and atherosclerosis is not unexpected.  Patient exercises regularly and denies any anginal symptoms should she start having any anginal symptoms, she should let us know.    Total 25 minutes face-to-face time spent with patient, with greater than 50% of the total time discussing patient's issues and symptoms as listed above in assessment and plan, as well as managing coordination of care for future evaluation and treatment. Most of the time was spent discussing CT scan results, calcification, dilatation as noted above.     Return to clinic in 1 year or earlier if needed.    Thank you for allowing me to participate in the care of this patient. Please do not hesitate to contact me with any questions.    Alda Crews MD  Cardiologist  [facility]      PLEASE NOTE: This dictation was created using voice recognition software.               Gee Perez M.D.  [address] [facility] [address]  VIA In Basket

## 2019-08-08 PROBLEM — J43.9 PULMONARY EMPHYSEMA (HCC): Status: ACTIVE | Noted: 2019-08-08

## 2019-08-08 PROBLEM — I71.20 THORACIC AORTIC ANEURYSM, WITHOUT RUPTURE: Status: ACTIVE | Noted: 2019-08-08

## 2019-11-09 PROBLEM — E78.5 HYPERLIPIDEMIA WITH TARGET LDL LESS THAN 100: Status: ACTIVE | Noted: 2019-11-09

## 2020-05-27 ENCOUNTER — HOSPITAL ENCOUNTER (OUTPATIENT)
Dept: RADIOLOGY | Facility: MEDICAL CENTER | Age: 70
End: 2020-05-27
Attending: INTERNAL MEDICINE
Payer: MEDICARE

## 2020-05-27 DIAGNOSIS — R91.1 SOLITARY LUNG NODULE: ICD-10-CM

## 2020-05-27 PROCEDURE — 71250 CT THORAX DX C-: CPT

## 2020-08-20 ENCOUNTER — APPOINTMENT (OUTPATIENT)
Dept: PULMONOLOGY | Facility: MEDICAL CENTER | Age: 70
End: 2020-08-20
Payer: MEDICARE

## 2020-09-17 ENCOUNTER — OFFICE VISIT (OUTPATIENT)
Dept: CARDIOLOGY | Facility: CLINIC | Age: 70
End: 2020-09-17
Payer: MEDICARE

## 2020-09-17 VITALS
OXYGEN SATURATION: 93 % | BODY MASS INDEX: 22.66 KG/M2 | DIASTOLIC BLOOD PRESSURE: 70 MMHG | WEIGHT: 153 LBS | SYSTOLIC BLOOD PRESSURE: 110 MMHG | HEART RATE: 66 BPM | HEIGHT: 69 IN

## 2020-09-17 DIAGNOSIS — R06.02 SHORTNESS OF BREATH: Primary | ICD-10-CM

## 2020-09-17 DIAGNOSIS — I25.10 CORONARY ARTERY CALCIFICATION: ICD-10-CM

## 2020-09-17 DIAGNOSIS — E78.2 MIXED HYPERLIPIDEMIA: ICD-10-CM

## 2020-09-17 DIAGNOSIS — I25.84 CORONARY ARTERY CALCIFICATION: ICD-10-CM

## 2020-09-17 DIAGNOSIS — Q25.1 COARCTATION OF THE AORTA, SIMPLEX: ICD-10-CM

## 2020-09-17 PROCEDURE — 99215 OFFICE O/P EST HI 40 MIN: CPT | Performed by: INTERNAL MEDICINE

## 2020-09-17 ASSESSMENT — ENCOUNTER SYMPTOMS
LOSS OF CONSCIOUSNESS: 0
FALLS: 0
DEPRESSION: 0
ABDOMINAL PAIN: 0
ORTHOPNEA: 0
PALPITATIONS: 0
PND: 0
DIZZINESS: 0
SHORTNESS OF BREATH: 1

## 2020-09-17 NOTE — LETTER
[facility] [address],   [address]nd [place] [address]  Phone: [phone]  Fax: [phone]              Jenise Arango  1950    Encounter Date: 9/17/2020    Alda Crews M.D.          PROGRESS NOTE:    Subjective:   Jenise Arango is a 68 y/o female presenting to clinic for f/u on coarctation of the aorta.     Pertinent history:  Coarctation of the aorta  Coronary artery calcification  History of left-sided breast cancer status post lumpectomy and radiation around 2015.      Patient's main concern is that about few months ago she started hiking and when she is hiking at 6000 to 9000 feet, especially an incline she will feel short of breath.  Symptoms improve after she rests for a few minutes.  She does not have any dyspnea with walking on a flat surface, no matter the elevation.  She denies any associated chest discomfort.  No symptoms at rest.  She endorses that she had not been doing aerobic exercise prior to that.      Past Medical History:   Diagnosis Date   • Back pain    • Cancer (HCC)     LEFT BREAST CANCER   • Plantar fasciitis      Past Surgical History:   Procedure Laterality Date   • OTHER      Right Side Femur Head   • OTHER      D& C   • OTHER ORTHOPEDIC SURGERY      LEFT FEMUR     History reviewed. No pertinent family history.     Social History     Socioeconomic History   • Marital status:      Spouse name: Not on file   • Number of children: Not on file   • Years of education: Not on file   • Highest education level: Not on file   Occupational History   • Not on file   Social Needs   • Financial resource strain: Not on file   • Food insecurity     Worry: Not on file     Inability: Not on file   • Transportation needs     Medical: Not on file     Non-medical: Not on file   Tobacco Use   • Smoking status: Former Smoker     Packs/day: 1.00     Years: 35.00     Pack years: 35.00     Types: Cigarettes     Quit date:  2011     Years since quittin.7   • Smokeless tobacco: Never Used   Substance and Sexual Activity   • Alcohol use: Yes     Alcohol/week: 0.0 oz     Frequency: 4 or more times a week     Drinks per session: 1 or 2     Binge frequency: Never     Comment: occasionally   • Drug use: No   • Sexual activity: Yes     Partners: Male   Lifestyle   • Physical activity     Days per week: Not on file     Minutes per session: Not on file   • Stress: Not on file   Relationships   • Social connections     Talks on phone: Not on file     Gets together: Not on file     Attends Christian service: Not on file     Active member of club or organization: Not on file     Attends meetings of clubs or organizations: Not on file     Relationship status: Not on file   • Intimate partner violence     Fear of current or ex partner: Not on file     Emotionally abused: Not on file     Physically abused: Not on file     Forced sexual activity: Not on file   Other Topics Concern   • Not on file   Social History Narrative   • Not on file     Allergies   Allergen Reactions   • Ceclor [Cefaclor]    • Norco [Apap-Fd&C Yellow #10 [place]-Hydrocodone] Vomiting   • Septra [Bactrim Ds]      Outpatient Encounter Medications as of 2020   Medication Sig Dispense Refill   • aspirin EC (ECOTRIN) 81 MG Tablet Delayed Response Take 1 Tab by mouth every day. 30 Tab    • magnesium oxide (MAG-OX) 400 MG Tab Take 400 mg by mouth every day.     • B Complex Vitamins (VITAMIN-B COMPLEX PO) Take  by mouth.     • Calcium Carbonate-Vit D-Min (CALCIUM 1200 PO) Take  by mouth.     • Probiotic Product (PROBIOTIC ACIDOPHILUS BEADS PO) Take  by mouth.     • Cholecalciferol (VITAMIN D-3) 1000 UNITS Cap Take 1 Capsule by mouth every day. 90 Cap 3   • PREVIDENT 5000 ENAMEL PROTECT 1.1-5 % Paste USE TWICE DAILY AND DO NOT RINSE AFTERWARD     • [DISCONTINUED] triamcinolone acetonide (KENALOG) 0.1 % Cream Apply bid prn (Patient not taking: Reported on 2020) 60 g 3    "    No facility-administered encounter medications on file as of 9/17/2020.      Review of Systems   Constitutional: Negative for malaise/fatigue.   Respiratory: Positive for shortness of breath.    Cardiovascular: Negative for chest pain, palpitations, orthopnea, leg swelling and PND.   Gastrointestinal: Negative for abdominal pain.   Musculoskeletal: Negative for falls.   Neurological: Negative for dizziness and loss of consciousness.   Psychiatric/Behavioral: Negative for depression.   All other systems reviewed and are negative.       Objective:   /70 (BP Location: Right arm, Patient Position: Sitting)   Pulse 66   Ht 1.753 m (5' 9\")   Wt 69.4 kg (153 lb)   SpO2 93%   BMI 22.59 kg/m²     Physical Exam   Constitutional: She is oriented to person, place, and time. She appears well-developed and well-nourished. No distress.   HENT:   Head: Normocephalic and atraumatic.   Eyes: Conjunctivae are normal. No scleral icterus.   Neck: Normal range of motion. Neck supple.   Cardiovascular: Normal rate, regular rhythm and normal heart sounds. Exam reveals no gallop and no friction rub.   No murmur heard.  Pulmonary/Chest: Effort normal and breath sounds normal. No respiratory distress. She has no wheezes. She has no rales.   Musculoskeletal:         General: No edema.   Neurological: She is alert and oriented to person, place, and time.   Skin: Skin is warm and dry. She is not diaphoretic.   Psychiatric: She has a normal mood and affect. Her behavior is normal. Judgment and thought content normal.   Nursing note and vitals reviewed.    CT chest performed in 2016 showed a coarctation of the aorta a few centimeters distal to the takeoff of the left subclavian artery.  The narrowing of the core measures 9-10 mm.  Post core dilation up to 3.3 cm involving the distal thoracic aorta.  No hypertrophy or lateralization through the intercostal arteries.    Echocardiogram performed in June 2018 showed normal LV systolic " function.  EF 65%.  Coarctation of the aorta with a peak gradient of 13 mmHg.  No major valvular pathology noted.    Exercise treadmill test performed in June 2018. Patient exercised for 8 minutes and 18 seconds on the Vladimir protocol.  Achieved 10.1 METs.  No arrhythmias noted.  Isolated PVCs noted.  No ST-T wave changes noted.    CT scan performed in May 2019 showed atherosclerosis of the thoracic aorta with coronary artery calcifications.  Mild dilatation of the descending thoracic aorta measuring 3.4 cm, unchanged from prior study.    Labs performed May 2020 were reviewed and showed normal creatinine.   in November    Assessment:     1. Shortness of breath  Treadmill Stress    EC-ECHOCARDIOGRAM COMPLETE W/O CONT   2. Coarctation of the aorta, simplex  EC-ECHOCARDIOGRAM COMPLETE W/O CONT   3. Coronary artery calcification  CT-CARDIAC SCORING    Treadmill Stress   4. Mixed hyperlipidemia         Medical Decision Making:  Today's Assessment / Status / Plan:       Patient's main concern is her dyspnea with exercise.  This is likely secondary to underlying COPD.  She is never been on inhalers which I have encouraged her to discuss with her primary care physician.  Her symptoms could be an anginal equivalent and therefore she will be referred for a treadmill stress test for further evaluation.    She has coronary artery calcification and a history of hyperlipidemia.  We discussed initiation of a baby aspirin every day which the patient is agreeable with.  We also discussed benefits of statin therapy however patient does not want to be on statins.  She would like to know the degree of her calcification.  A coronary calcium scoring CT has been ordered for further evaluation.    For coarctation of the aorta, a repeat echocardiogram has been ordered today for reevaluation.  She has overall been asymptomatic.    Return to clinic in 3 months or earlier if needed.    Thank you for allowing me to participate in the care  of this patient. Please do not hesitate to contact me with any questions.    Alda Crews MD  Cardiologist  [facility]      PLEASE NOTE: This dictation was created using voice recognition software.               Gee Perez M.D.  [address] [facility] [id number]  Via In Basket

## 2020-09-17 NOTE — PROGRESS NOTES
Subjective:   Jenise Arango is a 68 y/o female presenting to clinic for f/u on coarctation of the aorta.     Pertinent history:  Coarctation of the aorta  Coronary artery calcification  History of left-sided breast cancer status post lumpectomy and radiation around .      Patient's main concern is that about few months ago she started hiking and when she is hiking at 6000 to 9000 feet, especially an incline she will feel short of breath.  Symptoms improve after she rests for a few minutes.  She does not have any dyspnea with walking on a flat surface, no matter the elevation.  She denies any associated chest discomfort.  No symptoms at rest.  She endorses that she had not been doing aerobic exercise prior to that.      Past Medical History:   Diagnosis Date   • Back pain    • Cancer (HCC)     LEFT BREAST CANCER   • Plantar fasciitis      Past Surgical History:   Procedure Laterality Date   • OTHER      Right Side Femur Head   • OTHER      D& C   • OTHER ORTHOPEDIC SURGERY      LEFT FEMUR     History reviewed. No pertinent family history.     Social History     Socioeconomic History   • Marital status:      Spouse name: Not on file   • Number of children: Not on file   • Years of education: Not on file   • Highest education level: Not on file   Occupational History   • Not on file   Social Needs   • Financial resource strain: Not on file   • Food insecurity     Worry: Not on file     Inability: Not on file   • Transportation needs     Medical: Not on file     Non-medical: Not on file   Tobacco Use   • Smoking status: Former Smoker     Packs/day: 1.00     Years: 35.00     Pack years: 35.00     Types: Cigarettes     Quit date: 2011     Years since quittin.7   • Smokeless tobacco: Never Used   Substance and Sexual Activity   • Alcohol use: Yes     Alcohol/week: 0.0 oz     Frequency: 4 or more times a week     Drinks per session: 1 or 2     Binge frequency: Never     Comment: occasionally   • Drug  use: No   • Sexual activity: Yes     Partners: Male   Lifestyle   • Physical activity     Days per week: Not on file     Minutes per session: Not on file   • Stress: Not on file   Relationships   • Social connections     Talks on phone: Not on file     Gets together: Not on file     Attends Restorationism service: Not on file     Active member of club or organization: Not on file     Attends meetings of clubs or organizations: Not on file     Relationship status: Not on file   • Intimate partner violence     Fear of current or ex partner: Not on file     Emotionally abused: Not on file     Physically abused: Not on file     Forced sexual activity: Not on file   Other Topics Concern   • Not on file   Social History Narrative   • Not on file     Allergies   Allergen Reactions   • Ceclor [Cefaclor]    • Norco [Apap-Fd&C Yellow #10 [place]-Hydrocodone] Vomiting   • Septra [Bactrim Ds]      Outpatient Encounter Medications as of 9/17/2020   Medication Sig Dispense Refill   • aspirin EC (ECOTRIN) 81 MG Tablet Delayed Response Take 1 Tab by mouth every day. 30 Tab    • magnesium oxide (MAG-OX) 400 MG Tab Take 400 mg by mouth every day.     • B Complex Vitamins (VITAMIN-B COMPLEX PO) Take  by mouth.     • Calcium Carbonate-Vit D-Min (CALCIUM 1200 PO) Take  by mouth.     • Probiotic Product (PROBIOTIC ACIDOPHILUS BEADS PO) Take  by mouth.     • Cholecalciferol (VITAMIN D-3) 1000 UNITS Cap Take 1 Capsule by mouth every day. 90 Cap 3   • PREVIDENT 5000 ENAMEL PROTECT 1.1-5 % Paste USE TWICE DAILY AND DO NOT RINSE AFTERWARD     • [DISCONTINUED] triamcinolone acetonide (KENALOG) 0.1 % Cream Apply bid prn (Patient not taking: Reported on 9/4/2020) 60 g 3     No facility-administered encounter medications on file as of 9/17/2020.      Review of Systems   Constitutional: Negative for malaise/fatigue.   Respiratory: Positive for shortness of breath.    Cardiovascular: Negative for chest pain, palpitations, orthopnea, leg swelling and  "PND.   Gastrointestinal: Negative for abdominal pain.   Musculoskeletal: Negative for falls.   Neurological: Negative for dizziness and loss of consciousness.   Psychiatric/Behavioral: Negative for depression.   All other systems reviewed and are negative.       Objective:   /70 (BP Location: Right arm, Patient Position: Sitting)   Pulse 66   Ht 1.753 m (5' 9\")   Wt 69.4 kg (153 lb)   SpO2 93%   BMI 22.59 kg/m²     Physical Exam   Constitutional: She is oriented to person, place, and time. She appears well-developed and well-nourished. No distress.   HENT:   Head: Normocephalic and atraumatic.   Eyes: Conjunctivae are normal. No scleral icterus.   Neck: Normal range of motion. Neck supple.   Cardiovascular: Normal rate, regular rhythm and normal heart sounds. Exam reveals no gallop and no friction rub.   No murmur heard.  Pulmonary/Chest: Effort normal and breath sounds normal. No respiratory distress. She has no wheezes. She has no rales.   Musculoskeletal:         General: No edema.   Neurological: She is alert and oriented to person, place, and time.   Skin: Skin is warm and dry. She is not diaphoretic.   Psychiatric: She has a normal mood and affect. Her behavior is normal. Judgment and thought content normal.   Nursing note and vitals reviewed.    CT chest performed in 2016 showed a coarctation of the aorta a few centimeters distal to the takeoff of the left subclavian artery.  The narrowing of the core measures 9-10 mm.  Post core dilation up to 3.3 cm involving the distal thoracic aorta.  No hypertrophy or lateralization through the intercostal arteries.    Echocardiogram performed in June 2018 showed normal LV systolic function.  EF 65%.  Coarctation of the aorta with a peak gradient of 13 mmHg.  No major valvular pathology noted.    Exercise treadmill test performed in June 2018. Patient exercised for 8 minutes and 18 seconds on the Vladimir protocol.  Achieved 10.1 METs.  No arrhythmias noted.  " Isolated PVCs noted.  No ST-T wave changes noted.    CT scan performed in May 2019 showed atherosclerosis of the thoracic aorta with coronary artery calcifications.  Mild dilatation of the descending thoracic aorta measuring 3.4 cm, unchanged from prior study.    Labs performed May 2020 were reviewed and showed normal creatinine.   in November    Assessment:     1. Shortness of breath  Treadmill Stress    EC-ECHOCARDIOGRAM COMPLETE W/O CONT   2. Coarctation of the aorta, simplex  EC-ECHOCARDIOGRAM COMPLETE W/O CONT   3. Coronary artery calcification  CT-CARDIAC SCORING    Treadmill Stress   4. Mixed hyperlipidemia         Medical Decision Making:  Today's Assessment / Status / Plan:       Patient's main concern is her dyspnea with exercise.  This is likely secondary to underlying COPD.  She is never been on inhalers which I have encouraged her to discuss with her primary care physician.  Her symptoms could be an anginal equivalent and therefore she will be referred for a treadmill stress test for further evaluation.    She has coronary artery calcification and a history of hyperlipidemia.  We discussed initiation of a baby aspirin every day which the patient is agreeable with.  We also discussed benefits of statin therapy however patient does not want to be on statins.  She would like to know the degree of her calcification.  A coronary calcium scoring CT has been ordered for further evaluation.    For coarctation of the aorta, a repeat echocardiogram has been ordered today for reevaluation.  She has overall been asymptomatic.    Return to clinic in 3 months or earlier if needed.    Thank you for allowing me to participate in the care of this patient. Please do not hesitate to contact me with any questions.    Alda Crews MD  Cardiologist  [facility]      PLEASE NOTE: This dictation was created using voice recognition software.

## 2020-12-01 DIAGNOSIS — I25.10 CORONARY ARTERY CALCIFICATION: ICD-10-CM

## 2020-12-01 DIAGNOSIS — I25.84 CORONARY ARTERY CALCIFICATION: ICD-10-CM

## 2020-12-09 ENCOUNTER — TELEPHONE (OUTPATIENT)
Dept: CARDIOLOGY | Facility: MEDICAL CENTER | Age: 70
End: 2020-12-09

## 2020-12-09 NOTE — TELEPHONE ENCOUNTER
Alda Crews M.D.  Coco Matthews R.N.             Reviewed coronary calcium score.  She has some calcification.  At her last visit, she was hesitant about statin therapy.  Ideally we should start her on the statin now however if the patient would like to wait, we can reevaluate her coronary calcium score in 5 years.   Thank you   AA          Attempted to call pt, no answer, LM for her to call back.

## 2021-01-13 ENCOUNTER — APPOINTMENT (OUTPATIENT)
Dept: CARDIOLOGY | Facility: CLINIC | Age: 71
End: 2021-01-13
Payer: MEDICARE

## 2021-04-30 ENCOUNTER — OFFICE VISIT (OUTPATIENT)
Dept: CARDIOLOGY | Facility: CLINIC | Age: 71
End: 2021-04-30
Payer: MEDICARE

## 2021-04-30 VITALS
WEIGHT: 156 LBS | HEART RATE: 61 BPM | HEIGHT: 69 IN | BODY MASS INDEX: 23.11 KG/M2 | SYSTOLIC BLOOD PRESSURE: 110 MMHG | DIASTOLIC BLOOD PRESSURE: 70 MMHG | OXYGEN SATURATION: 94 %

## 2021-04-30 DIAGNOSIS — I25.84 CORONARY ARTERY CALCIFICATION: ICD-10-CM

## 2021-04-30 DIAGNOSIS — R06.02 SHORTNESS OF BREATH: ICD-10-CM

## 2021-04-30 DIAGNOSIS — I25.10 CORONARY ARTERY CALCIFICATION: ICD-10-CM

## 2021-04-30 DIAGNOSIS — Q25.1 COARCTATION OF THE AORTA, SIMPLEX: ICD-10-CM

## 2021-04-30 PROCEDURE — 99214 OFFICE O/P EST MOD 30 MIN: CPT | Performed by: INTERNAL MEDICINE

## 2021-04-30 ASSESSMENT — ENCOUNTER SYMPTOMS
DIZZINESS: 0
PALPITATIONS: 0
FALLS: 0
ABDOMINAL PAIN: 0
LOSS OF CONSCIOUSNESS: 0
ORTHOPNEA: 0
SHORTNESS OF BREATH: 1
DEPRESSION: 0
PND: 0

## 2021-04-30 NOTE — PROGRESS NOTES
Subjective:   Jenise Arango is a 71 y/o female presenting to clinic for follow-up on coarctation of the aorta.    Pertinent history:  Coarctation of the aorta  Coronary artery calcification  History of left-sided breast cancer status post lumpectomy and radiation around .      Patient continues to complain of dyspnea with going up inclines.  Due to the pandemic, she stopped exercising.  She recently started hiking again and noticed that she was getting winded which is unusual for her.  She denies any associated chest discomfort.  She reports having a screening CT scan that showed concern for mild emphysema.      Past Medical History:   Diagnosis Date   • Back pain    • Cancer (HCC)     LEFT BREAST CANCER   • Plantar fasciitis      Past Surgical History:   Procedure Laterality Date   • OTHER      Right Side Femur Head   • OTHER      D& C   • OTHER ORTHOPEDIC SURGERY      LEFT FEMUR     History reviewed. No pertinent family history.     Social History     Socioeconomic History   • Marital status:      Spouse name: Not on file   • Number of children: Not on file   • Years of education: Not on file   • Highest education level: Not on file   Occupational History   • Not on file   Tobacco Use   • Smoking status: Former Smoker     Packs/day: 1.00     Years: 35.00     Pack years: 35.00     Types: Cigarettes     Quit date: 2011     Years since quittin.3   • Smokeless tobacco: Never Used   Substance and Sexual Activity   • Alcohol use: Yes     Alcohol/week: 0.0 oz     Comment: occasionally   • Drug use: No   • Sexual activity: Yes     Partners: Male   Other Topics Concern   • Not on file   Social History Narrative   • Not on file     Social Determinants of Health     Financial Resource Strain:    • Difficulty of Paying Living Expenses:    Food Insecurity:    • Worried About Running Out of Food in the Last Year:    • Ran Out of Food in the Last Year:    Transportation Needs:    • Lack of Transportation  (Medical):    • Lack of Transportation (Non-Medical):    Physical Activity:    • Days of Exercise per Week:    • Minutes of Exercise per Session:    Stress:    • Feeling of Stress :    Social Connections:    • Frequency of Communication with Friends and Family:    • Frequency of Social Gatherings with Friends and Family:    • Attends Zoroastrianism Services:    • Active Member of Clubs or Organizations:    • Attends Club or Organization Meetings:    • Marital Status:    Intimate Partner Violence:    • Fear of Current or Ex-Partner:    • Emotionally Abused:    • Physically Abused:    • Sexually Abused:      Allergies   Allergen Reactions   • Ceclor [Cefaclor]    • Ceftazidime      Other reaction(s): Unknown   • Hydrochlorothiazide      Other reaction(s): Unknown   • Hydrocodone Nausea and Vomiting     nausea vomiting   • Norco [Apap-Fd&C Yellow #10 [place]-Hydrocodone] Vomiting   • Septra [Bactrim Ds]    • Sulfamethoxazole-Trimethoprim Hives and Rash     Outpatient Encounter Medications as of 4/30/2021   Medication Sig Dispense Refill   • PREVIDENT 5000 ENAMEL PROTECT 1.1-5 % Paste USE TWICE DAILY AND DO NOT RINSE AFTERWARD     • aspirin EC (ECOTRIN) 81 MG Tablet Delayed Response Take 1 Tab by mouth every day. 30 Tab    • magnesium oxide (MAG-OX) 400 MG Tab Take 400 mg by mouth every day.     • B Complex Vitamins (VITAMIN-B COMPLEX PO) Take  by mouth.     • Calcium Carbonate-Vit D-Min (CALCIUM 1200 PO) Take  by mouth.     • Probiotic Product (PROBIOTIC ACIDOPHILUS BEADS PO) Take  by mouth.     • Cholecalciferol (VITAMIN D-3) 1000 UNITS Cap Take 1 Capsule by mouth every day. 90 Cap 3     No facility-administered encounter medications on file as of 4/30/2021.     Review of Systems   Constitutional: Negative for malaise/fatigue.   Respiratory: Positive for shortness of breath.    Cardiovascular: Negative for chest pain, palpitations, orthopnea, leg swelling and PND.   Gastrointestinal: Negative for abdominal pain.  "  Musculoskeletal: Negative for falls.   Neurological: Negative for dizziness and loss of consciousness.   Psychiatric/Behavioral: Negative for depression.   All other systems reviewed and are negative.       Objective:   /70 (BP Location: Left arm, Patient Position: Sitting, BP Cuff Size: Adult)   Pulse 61   Ht 1.753 m (5' 9\")   Wt 70.8 kg (156 lb)   SpO2 94%   BMI 23.04 kg/m²     Physical Exam   Constitutional: She is oriented to person, place, and time. She appears well-developed and well-nourished. No distress.   HENT:   Head: Normocephalic and atraumatic.   Eyes: Conjunctivae are normal. No scleral icterus.   Cardiovascular: Normal rate, regular rhythm and normal heart sounds. Exam reveals no gallop and no friction rub.   No murmur heard.  Pulmonary/Chest: Effort normal and breath sounds normal. No respiratory distress. She has no wheezes. She has no rales.   Musculoskeletal:         General: No edema.      Cervical back: Normal range of motion and neck supple.   Neurological: She is alert and oriented to person, place, and time.   Skin: Skin is warm and dry. She is not diaphoretic.   Psychiatric: She has a normal mood and affect. Her behavior is normal. Judgment and thought content normal.   Nursing note and vitals reviewed.    CT chest performed in 2016 showed a coarctation of the aorta a few centimeters distal to the takeoff of the left subclavian artery.  The narrowing of the core measures 9-10 mm.  Post core dilation up to 3.3 cm involving the distal thoracic aorta.  No hypertrophy or lateralization through the intercostal arteries.    Echocardiogram performed in June 2018 showed normal LV systolic function.  EF 65%.  Coarctation of the aorta with a peak gradient of 13 mmHg.  No major valvular pathology noted.    Exercise treadmill test performed in October 2020 was personally reviewed and per my interpretation did not show any ischemic ST-T wave changes.  Patient exercised over 8 minutes on the " Vladimir protocol.  Oxygen saturation ranging in the 89 to 90% was noted.    Labs performed Nov 2020 were reviewed and showed normal renal function.     Echocardiogram performed Dec 2020 was personally reviewed and per my interpretation showed preserved LV systolic function.  Peak gradient across the coarctation 13 mmHg.    Coronary calcium score performed in December 2020 showed total score of 40.    Assessment:     1. Coarctation of the aorta, simplex     2. Shortness of breath     3. Coronary artery calcification         Medical Decision Making:  Today's Assessment / Status / Plan:     Patient has coarctation of the aorta.  Repeat echocardiogram performed recently did not show any significant changes in the gradient.    Her dyspnea is likely secondary to underlying emphysema.  She underwent an exercise treadmill test that was low risk along with coronary calcium scoring which was mildly abnormal.  Her oxygen saturation with exercise was noted to be borderline low.  I have encouraged her to follow-up with her pulmonologist to discuss treatment for her emphysema.    She has coronary calcification.  Continue aspirin daily.  We have discussed statins but patient would like to avoid them for now.  She understands the benefits of statins.    Return to clinic in 1-2 years or earlier if needed.    Thank you for allowing me to participate in the care of this patient. Please do not hesitate to contact me with any questions.    Alda Crews MD  Cardiologist  [facility]      PLEASE NOTE: This dictation was created using voice recognition software.

## 2021-04-30 NOTE — LETTER
[facility] Health[facility] [address],   [address]nd [place] [address]  Phone: [phone]  Fax: [phone]              Jenise Arango  1950    Encounter Date: 2021    Alda Crews M.D.          PROGRESS NOTE:    Subjective:   Jenise Arango is a 71 y/o female presenting to clinic for follow-up on coarctation of the aorta.    Pertinent history:  Coarctation of the aorta  Coronary artery calcification  History of left-sided breast cancer status post lumpectomy and radiation around .      Patient continues to complain of dyspnea with going up inclines.  Due to the pandemic, she stopped exercising.  She recently started hiking again and noticed that she was getting winded which is unusual for her.  She denies any associated chest discomfort.  She reports having a screening CT scan that showed concern for mild emphysema.      Past Medical History:   Diagnosis Date   • Back pain    • Cancer (HCC)     LEFT BREAST CANCER   • Plantar fasciitis      Past Surgical History:   Procedure Laterality Date   • OTHER      Right Side Femur Head   • OTHER      D& C   • OTHER ORTHOPEDIC SURGERY      LEFT FEMUR     History reviewed. No pertinent family history.     Social History     Socioeconomic History   • Marital status:      Spouse name: Not on file   • Number of children: Not on file   • Years of education: Not on file   • Highest education level: Not on file   Occupational History   • Not on file   Tobacco Use   • Smoking status: Former Smoker     Packs/day: 1.00     Years: 35.00     Pack years: 35.00     Types: Cigarettes     Quit date: 2011     Years since quittin.3   • Smokeless tobacco: Never Used   Substance and Sexual Activity   • Alcohol use: Yes     Alcohol/week: 0.0 oz     Comment: occasionally   • Drug use: No   • Sexual activity: Yes     Partners: Male   Other Topics Concern   • Not on file   Social History Narrative   • Not on  file     Social Determinants of Health     Financial Resource Strain:    • Difficulty of Paying Living Expenses:    Food Insecurity:    • Worried About Running Out of Food in the Last Year:    • Ran Out of Food in the Last Year:    Transportation Needs:    • Lack of Transportation (Medical):    • Lack of Transportation (Non-Medical):    Physical Activity:    • Days of Exercise per Week:    • Minutes of Exercise per Session:    Stress:    • Feeling of Stress :    Social Connections:    • Frequency of Communication with Friends and Family:    • Frequency of Social Gatherings with Friends and Family:    • Attends [employer] Services:    • Active Member of Clubs or Organizations:    • Attends Club or Organization Meetings:    • Marital Status:    Intimate Partner Violence:    • Fear of Current or Ex-Partner:    • Emotionally Abused:    • Physically Abused:    • Sexually Abused:      Allergies   Allergen Reactions   • Ceclor [Cefaclor]    • Ceftazidime      Other reaction(s): Unknown   • Hydrochlorothiazide      Other reaction(s): Unknown   • Hydrocodone Nausea and Vomiting     nausea vomiting   • Norco [Apap-Fd&C Yellow #10 [place]-Hydrocodone] Vomiting   • Septra [Bactrim Ds]    • Sulfamethoxazole-Trimethoprim Hives and Rash     Outpatient Encounter Medications as of 4/30/2021   Medication Sig Dispense Refill   • PREVIDENT 5000 ENAMEL PROTECT 1.1-5 % Paste USE TWICE DAILY AND DO NOT RINSE AFTERWARD     • aspirin EC (ECOTRIN) 81 MG Tablet Delayed Response Take 1 Tab by mouth every day. 30 Tab    • magnesium oxide (MAG-OX) 400 MG Tab Take 400 mg by mouth every day.     • B Complex Vitamins (VITAMIN-B COMPLEX PO) Take  by mouth.     • Calcium Carbonate-Vit D-Min (CALCIUM 1200 PO) Take  by mouth.     • Probiotic Product (PROBIOTIC ACIDOPHILUS BEADS PO) Take  by mouth.     • Cholecalciferol (VITAMIN D-3) 1000 UNITS Cap Take 1 Capsule by mouth every day. 90 Cap 3     No facility-administered encounter medications on file as  "of 4/30/2021.     Review of Systems   Constitutional: Negative for malaise/fatigue.   Respiratory: Positive for shortness of breath.    Cardiovascular: Negative for chest pain, palpitations, orthopnea, leg swelling and PND.   Gastrointestinal: Negative for abdominal pain.   Musculoskeletal: Negative for falls.   Neurological: Negative for dizziness and loss of consciousness.   Psychiatric/Behavioral: Negative for depression.   All other systems reviewed and are negative.       Objective:   /70 (BP Location: Left arm, Patient Position: Sitting, BP Cuff Size: Adult)   Pulse 61   Ht 1.753 m (5' 9\")   Wt 70.8 kg (156 lb)   SpO2 94%   BMI 23.04 kg/m²     Physical Exam   Constitutional: She is oriented to person, place, and time. She appears well-developed and well-nourished. No distress.   HENT:   Head: Normocephalic and atraumatic.   Eyes: Conjunctivae are normal. No scleral icterus.   Cardiovascular: Normal rate, regular rhythm and normal heart sounds. Exam reveals no gallop and no friction rub.   No murmur heard.  Pulmonary/Chest: Effort normal and breath sounds normal. No respiratory distress. She has no wheezes. She has no rales.   Musculoskeletal:         General: No edema.      Cervical back: Normal range of motion and neck supple.   Neurological: She is alert and oriented to person, place, and time.   Skin: Skin is warm and dry. She is not diaphoretic.   Psychiatric: She has a normal mood and affect. Her behavior is normal. Judgment and thought content normal.   Nursing note and vitals reviewed.    CT chest performed in 2016 showed a coarctation of the aorta a few centimeters distal to the takeoff of the left subclavian artery.  The narrowing of the core measures 9-10 mm.  Post core dilation up to 3.3 cm involving the distal thoracic aorta.  No hypertrophy or lateralization through the intercostal arteries.    Echocardiogram performed in June 2018 showed normal LV systolic function.  EF 65%.  " Coarctation of the aorta with a peak gradient of 13 mmHg.  No major valvular pathology noted.    Exercise treadmill test performed in October 2020 was personally reviewed and per my interpretation did not show any ischemic ST-T wave changes.  Patient exercised over 8 minutes on the Vladimir protocol.  Oxygen saturation ranging in the 89 to 90% was noted.    Labs performed Nov 2020 were reviewed and showed normal renal function.     Echocardiogram performed Dec 2020 was personally reviewed and per my interpretation showed preserved LV systolic function.  Peak gradient across the coarctation 13 mmHg.    Coronary calcium score performed in December 2020 showed total score of 40.    Assessment:     1. Coarctation of the aorta, simplex     2. Shortness of breath     3. Coronary artery calcification         Medical Decision Making:  Today's Assessment / Status / Plan:     Patient has coarctation of the aorta.  Repeat echocardiogram performed recently did not show any significant changes in the gradient.    Her dyspnea is likely secondary to underlying emphysema.  She underwent an exercise treadmill test that was low risk along with coronary calcium scoring which was mildly abnormal.  Her oxygen saturation with exercise was noted to be borderline low.  I have encouraged her to follow-up with her pulmonologist to discuss treatment for her emphysema.    She has coronary calcification.  Continue aspirin daily.  We have discussed statins but patient would like to avoid them for now.  She understands the benefits of statins.    Return to clinic in 1-2 years or earlier if needed.    Thank you for allowing me to participate in the care of this patient. Please do not hesitate to contact me with any questions.    Alda Crews MD  Cardiologist  [facility]      PLEASE NOTE: This dictation was created using voice recognition software.                 Gee Perez M.D.  [address] [facility]  [place] [address]  Via In Basket

## 2021-06-04 ENCOUNTER — HOSPITAL ENCOUNTER (OUTPATIENT)
Dept: RADIOLOGY | Facility: MEDICAL CENTER | Age: 71
End: 2021-06-04
Attending: INTERNAL MEDICINE
Payer: MEDICARE

## 2021-06-04 DIAGNOSIS — R91.1 PULMONARY NODULE: ICD-10-CM

## 2021-06-04 PROCEDURE — 71250 CT THORAX DX C-: CPT | Mod: MH

## 2021-12-10 ENCOUNTER — PATIENT MESSAGE (OUTPATIENT)
Dept: HEALTH INFORMATION MANAGEMENT | Facility: OTHER | Age: 71
End: 2021-12-10

## 2022-04-28 NOTE — PROGRESS NOTES
Subjective:   Jenise Arango is a 67 y.o. female presenting to clinic for follow-up on her coarctation of the aorta.      Pertinent history:  Coarctation of the aorta  History of left-sided breast cancer status post lumpectomy and radiation around 2015.    Since her last appointment, patient has been feeling really well.  She has not started exercising a lot more frequently.  She has been swimming regularly.  Does report dyspnea on exertion with swimming.  Overall however her symptoms have been improving.  No chest discomfort with exertion.    She describes a history of left-sided pain underneath her left breast that has been ongoing for the past 20 years.  Patient has previously been told she has costochondritis and her symptoms are unchanged in severity or frequency.     Past Medical History:   Diagnosis Date   • Back pain    • Cancer (HCC)     LEFT BREAST CANCER   • Plantar fasciitis      Past Surgical History:   Procedure Laterality Date   • OTHER      Right Side Femur Head   • OTHER      D& C   • OTHER ORTHOPEDIC SURGERY      LEFT FEMUR     History reviewed. No pertinent family history.     Social History     Social History   • Marital status:      Spouse name: N/A   • Number of children: N/A   • Years of education: N/A     Occupational History   • Not on file.     Social History Main Topics   • Smoking status: Former Smoker     Packs/day: 1.00     Years: 35.00     Types: Cigarettes     Quit date: 12/13/2011   • Smokeless tobacco: Never Used   • Alcohol use 0.0 oz/week      Comment: occasionally   • Drug use: No   • Sexual activity: Yes     Partners: Male     Other Topics Concern   • Not on file     Social History Narrative   • No narrative on file     No recreational drug use.  Used to work for IT in a hospital.    Allergies   Allergen Reactions   • Ceclor [Cefaclor]    • Norco [Apap-Fd&C Yellow #10 [place]-Hydrocodone] Vomiting   • Septra [Bactrim Ds]      Outpatient Encounter Prescriptions as of  "8/21/2018   Medication Sig Dispense Refill   • magnesium oxide (MAG-OX) 400 MG Tab Take 400 mg by mouth every day.     • B Complex Vitamins (VITAMIN-B COMPLEX PO) Take  by mouth.     • Calcium Carbonate-Vit D-Min (CALCIUM 1200 PO) Take  by mouth.     • Probiotic Product (PROBIOTIC ACIDOPHILUS BEADS PO) Take  by mouth.     • Cholecalciferol (VITAMIN D-3) 1000 UNITS Cap Take 1 Capsule by mouth every day. 90 Cap 3   • [DISCONTINUED] aspirin EC (ECOTRIN) 81 MG Tablet Delayed Response Take 1 Tab by mouth every day. 100 Tab 3     No facility-administered encounter medications on file as of 8/21/2018.      Review of Systems   Constitutional: Negative for malaise/fatigue.   Respiratory: Positive for shortness of breath.    Cardiovascular: Positive for chest pain. Negative for palpitations, orthopnea, leg swelling and PND.   Gastrointestinal: Negative for abdominal pain.   Musculoskeletal: Negative for falls.   Neurological: Negative for dizziness and loss of consciousness.   Psychiatric/Behavioral: Negative for depression.   All other systems reviewed and are negative.       Objective:   /70   Pulse 66   Ht 1.753 m (5' 9\")   Wt 69.4 kg (153 lb)   SpO2 96%   BMI 22.59 kg/m²     Physical Exam   Constitutional: She is oriented to person, place, and time. She appears well-developed and well-nourished. No distress.   HENT:   Head: Normocephalic and atraumatic.   Eyes: Conjunctivae are normal.   Neck: Normal range of motion. Neck supple.   Cardiovascular: Normal rate, regular rhythm and normal heart sounds.  Exam reveals no gallop and no friction rub.    No murmur heard.  Pulmonary/Chest: Effort normal and breath sounds normal. No respiratory distress. She has no wheezes. She has no rales.   Abdominal: Soft. She exhibits no distension. There is no tenderness.   Musculoskeletal: She exhibits no edema.   Neurological: She is alert and oriented to person, place, and time.   Skin: Skin is warm and dry. She is not " diaphoretic.   Psychiatric: She has a normal mood and affect. Her behavior is normal.   Nursing note and vitals reviewed.    CT chest performed in 2016 showed a coarctation of the aorta a few centimeters distal to the takeoff of the left subclavian artery.  The narrowing of the core measures 9-10 mm.  Post core dilation up to 3.3 cm involving the distal thoracic aorta.  No hypertrophy or lateralization through the intercostal arteries.    Echocardiogram performed in June 2018 was reviewed and showed normal LV systolic function.  EF 65%.  Coarctation of the aorta with a peak gradient of 13 mmHg.  No major valvular pathology noted.    Exercise treadmill test performed in June 2018.  Tracings were personally reviewed.  Patient exercised for 8 minutes and 18 seconds on the Vladimir protocol.  Achieved 10.1 METs.  No arrhythmias noted.  Isolated PVCs noted.  No ST-T wave changes noted.    Assessment:     1. Coarctation of the aorta, simplex     2. Chest pain, unspecified type     3. Dyspnea on exertion         Medical Decision Making:  Today's Assessment / Status / Plan:     Coarctation of the aorta: Echocardiogram reviewed as above.  Peak gradient across the aortic coarctation is 13 mmHg, mild coarctation.  Patient will need echocardiograms every 2 or 3 years for reevaluation unless symptoms change.    Dyspnea on exertion: Possibly related to her significant smoking history.  She does not have an official COPD diagnosis.  Unlikely to be her anginal equivalent as her treadmill stress test was unremarkable as noted above.  Continue exercising as tolerated.  She may need to follow-up with her primary care physician to discuss inhalers if needed or pulmonary function testing as deemed necessary by her PCP.    Chest discomfort: Still has ongoing symptoms.  Atypical in nature.  Likely musculoskeletal as discussed above in the HPI.  Low risk treadmill stress test.  Patient can try warm/cold compresses as needed.  Try stretching  exercises as well.  NSAIDs as needed.  No further cardiac workup at this time.      Return to clinic in 1 year or earlier if needed.    Thank you for allowing me to participate in the care of this patient. Please do not hesitate to contact me with any questions.    Alda Crews MD  Cardiologist  [facility]      PLEASE NOTE: This dictation was created using voice recognition software.            No

## 2022-06-17 ENCOUNTER — HOSPITAL ENCOUNTER (OUTPATIENT)
Dept: RADIOLOGY | Facility: MEDICAL CENTER | Age: 72
End: 2022-06-17
Attending: INTERNAL MEDICINE
Payer: MEDICARE

## 2022-06-17 DIAGNOSIS — R91.1 COIN LESION: ICD-10-CM

## 2022-06-17 DIAGNOSIS — Z87.891 PERSONAL HISTORY OF TOBACCO USE, PRESENTING HAZARDS TO HEALTH: ICD-10-CM

## 2022-06-17 PROCEDURE — 71271 CT THORAX LUNG CANCER SCR C-: CPT

## 2023-06-26 ENCOUNTER — HOSPITAL ENCOUNTER (OUTPATIENT)
Dept: RADIOLOGY | Facility: MEDICAL CENTER | Age: 73
End: 2023-06-26
Attending: INTERNAL MEDICINE
Payer: MEDICARE

## 2023-06-26 DIAGNOSIS — Z87.891 FORMER SMOKER: ICD-10-CM

## 2023-06-26 DIAGNOSIS — R91.1 SOLITARY PULMONARY NODULE: ICD-10-CM

## 2023-06-26 PROCEDURE — 71271 CT THORAX LUNG CANCER SCR C-: CPT

## 2023-11-06 PROBLEM — Z82.0 FAMILY HISTORY OF ALZHEIMER'S DISEASE: Status: ACTIVE | Noted: 2023-11-06

## 2023-12-12 PROBLEM — M80.00XA OSTEOPOROSIS WITH CURRENT PATHOLOGICAL FRACTURE: Status: ACTIVE | Noted: 2023-12-12

## 2024-05-06 ENCOUNTER — HOSPITAL ENCOUNTER (OUTPATIENT)
Dept: RADIOLOGY | Facility: MEDICAL CENTER | Age: 74
End: 2024-05-06
Attending: ORTHOPAEDIC SURGERY
Payer: MEDICARE

## 2024-05-06 DIAGNOSIS — M13.851 OTHER SPECIFIED ARTHRITIS, RIGHT HIP: ICD-10-CM

## 2024-05-30 PROBLEM — G47.00 INSOMNIA: Status: ACTIVE | Noted: 2024-05-30

## 2024-07-02 ENCOUNTER — APPOINTMENT (OUTPATIENT)
Dept: RADIOLOGY | Facility: MEDICAL CENTER | Age: 74
End: 2024-07-02
Attending: INTERNAL MEDICINE
Payer: MEDICARE

## 2024-07-17 ENCOUNTER — TELEPHONE (OUTPATIENT)
Dept: HEALTH INFORMATION MANAGEMENT | Facility: OTHER | Age: 74
End: 2024-07-17
Payer: MEDICARE

## 2024-07-23 ENCOUNTER — HOSPITAL ENCOUNTER (OUTPATIENT)
Dept: RADIOLOGY | Facility: MEDICAL CENTER | Age: 74
End: 2024-07-23
Attending: INTERNAL MEDICINE
Payer: MEDICARE

## 2024-07-23 DIAGNOSIS — R91.1 SOLITARY PULMONARY NODULE: ICD-10-CM

## 2024-07-23 PROCEDURE — 71271 CT THORAX LUNG CANCER SCR C-: CPT

## 2024-07-31 DIAGNOSIS — I71.20 THORACIC AORTIC ANEURYSM WITHOUT RUPTURE, UNSPECIFIED PART (HCC): ICD-10-CM

## 2025-04-30 PROBLEM — I49.3 PVC'S (PREMATURE VENTRICULAR CONTRACTIONS): Status: ACTIVE | Noted: 2025-04-30

## 2025-04-30 PROBLEM — I47.10 SVT (SUPRAVENTRICULAR TACHYCARDIA) (HCC): Status: ACTIVE | Noted: 2025-04-30

## 2025-06-05 ENCOUNTER — TELEPHONE (OUTPATIENT)
Dept: HEALTH INFORMATION MANAGEMENT | Facility: OTHER | Age: 75
End: 2025-06-05
Payer: MEDICARE

## 2025-06-05 NOTE — TELEPHONE ENCOUNTER
Do you have any new problems with your breathing since your last exam?   (Like a cough that does not go away, chest pain when you breathe or cough, coughing up blood or rust colored phlegm or spit, shortness of breath that is new to you) No     **If new symptoms are attributed to lingering Covid infection, patient is still    eligible unless coughing up blood but should wait 2 months after Covid diagnosis for an accurate screening result. Still request order if patient qualifies.**   N/A    Have you been diagnosed with lung cancer since your last exam?  No     Are you a current smoker or former smoker? If former smoker, did you quit smoking within the last 15 years? Former, 14 yrs ago    Have you had a CT scan of your chest within the past 12 mos.? Yes, CT 7/24/2024

## 2025-07-28 ENCOUNTER — APPOINTMENT (OUTPATIENT)
Dept: RADIOLOGY | Facility: MEDICAL CENTER | Age: 75
End: 2025-07-28
Attending: INTERNAL MEDICINE
Payer: MEDICARE